# Patient Record
Sex: FEMALE | Race: WHITE
[De-identification: names, ages, dates, MRNs, and addresses within clinical notes are randomized per-mention and may not be internally consistent; named-entity substitution may affect disease eponyms.]

---

## 2018-08-12 ENCOUNTER — HOSPITAL ENCOUNTER (EMERGENCY)
Dept: HOSPITAL 4 - SED | Age: 43
Discharge: HOME | End: 2018-08-12
Payer: COMMERCIAL

## 2018-08-12 VITALS — SYSTOLIC BLOOD PRESSURE: 113 MMHG

## 2018-08-12 VITALS — SYSTOLIC BLOOD PRESSURE: 114 MMHG

## 2018-08-12 VITALS — WEIGHT: 182 LBS | HEIGHT: 66 IN | BODY MASS INDEX: 29.25 KG/M2

## 2018-08-12 DIAGNOSIS — I10: ICD-10-CM

## 2018-08-12 DIAGNOSIS — Z90.89: ICD-10-CM

## 2018-08-12 DIAGNOSIS — Z88.6: ICD-10-CM

## 2018-08-12 DIAGNOSIS — Z98.84: ICD-10-CM

## 2018-08-12 DIAGNOSIS — K64.5: Primary | ICD-10-CM

## 2018-08-12 DIAGNOSIS — K21.9: ICD-10-CM

## 2018-08-12 DIAGNOSIS — Z86.2: ICD-10-CM

## 2018-08-12 DIAGNOSIS — Z90.49: ICD-10-CM

## 2018-08-12 LAB
ALBUMIN SERPL BCP-MCNC: 3.9 G/DL (ref 3.4–4.8)
ALT SERPL W P-5'-P-CCNC: 16 U/L (ref 12–78)
ANION GAP SERPL CALCULATED.3IONS-SCNC: 8 MMOL/L (ref 5–15)
APPEARANCE UR: CLEAR
AST SERPL W P-5'-P-CCNC: 17 U/L (ref 10–37)
BASOPHILS # BLD AUTO: 0.1 K/UL (ref 0–0.2)
BASOPHILS NFR BLD AUTO: 1.3 % (ref 0–2)
BILIRUB SERPL-MCNC: 0.3 MG/DL (ref 0–1)
BILIRUB UR QL STRIP: NEGATIVE
BUN SERPL-MCNC: 14 MG/DL (ref 8–21)
CALCIUM SERPL-MCNC: 9.1 MG/DL (ref 8.4–11)
CHLORIDE SERPL-SCNC: 106 MMOL/L (ref 98–107)
COLOR UR: YELLOW
CREAT SERPL-MCNC: 0.59 MG/DL (ref 0.55–1.3)
EOSINOPHIL # BLD AUTO: 0.1 K/UL (ref 0–0.4)
EOSINOPHIL NFR BLD AUTO: 1.3 % (ref 0–4)
ERYTHROCYTE [DISTWIDTH] IN BLOOD BY AUTOMATED COUNT: 12.8 % (ref 9–15)
GFR SERPL CREATININE-BSD FRML MDRD: 144 ML/MIN (ref 90–?)
GLUCOSE SERPL-MCNC: 68 MG/DL (ref 70–99)
GLUCOSE UR STRIP-MCNC: NEGATIVE MG/DL
HCT VFR BLD AUTO: 35.6 % (ref 36–48)
HGB BLD-MCNC: 12 G/DL (ref 12–16)
HGB UR QL STRIP: NEGATIVE
KETONES UR STRIP-MCNC: NEGATIVE MG/DL
LEUKOCYTE ESTERASE UR QL STRIP: NEGATIVE
LYMPHOCYTES # BLD AUTO: 1.4 K/UL (ref 1–5.5)
LYMPHOCYTES NFR BLD AUTO: 24.6 % (ref 20.5–51.5)
MCH RBC QN AUTO: 30 PG (ref 27–31)
MCHC RBC AUTO-ENTMCNC: 34 % (ref 32–36)
MCV RBC AUTO: 89 FL (ref 79–98)
MONOCYTES # BLD MANUAL: 0.6 K/UL (ref 0–1)
MONOCYTES # BLD MANUAL: 10.2 % (ref 1.7–9.3)
NEUTROPHILS # BLD AUTO: 3.4 K/UL (ref 1.8–7.7)
NEUTROPHILS NFR BLD AUTO: 62.6 % (ref 40–70)
NITRITE UR QL STRIP: NEGATIVE
PH UR STRIP: 6 [PH] (ref 5–8)
PLATELET # BLD AUTO: 263 K/UL (ref 130–430)
POTASSIUM SERPL-SCNC: 3.7 MMOL/L (ref 3.5–5.1)
PROT UR QL STRIP: NEGATIVE
RBC # BLD AUTO: 3.98 MIL/UL (ref 4.2–6.2)
SODIUM SERPLBLD-SCNC: 139 MMOL/L (ref 136–145)
SP GR UR STRIP: 1.01 (ref 1–1.03)
UROBILINOGEN UR STRIP-MCNC: 0.2 MG/DL (ref 0.2–1)
WBC # BLD AUTO: 5.6 K/UL (ref 4.8–10.8)

## 2019-05-10 ENCOUNTER — HOSPITAL ENCOUNTER (INPATIENT)
Dept: HOSPITAL 4 - SED | Age: 44
LOS: 3 days | Discharge: HOME | DRG: 392 | End: 2019-05-13
Attending: INTERNAL MEDICINE | Admitting: INTERNAL MEDICINE
Payer: COMMERCIAL

## 2019-05-10 VITALS — SYSTOLIC BLOOD PRESSURE: 135 MMHG

## 2019-05-10 VITALS — BODY MASS INDEX: 29.35 KG/M2 | HEIGHT: 67 IN | WEIGHT: 187 LBS

## 2019-05-10 DIAGNOSIS — K29.70: Primary | ICD-10-CM

## 2019-05-10 DIAGNOSIS — Z98.84: ICD-10-CM

## 2019-05-10 DIAGNOSIS — I10: ICD-10-CM

## 2019-05-10 DIAGNOSIS — Z88.6: ICD-10-CM

## 2019-05-10 DIAGNOSIS — Z79.899: ICD-10-CM

## 2019-05-10 DIAGNOSIS — Z90.49: ICD-10-CM

## 2019-05-10 DIAGNOSIS — K21.9: ICD-10-CM

## 2019-05-10 DIAGNOSIS — K56.7: ICD-10-CM

## 2019-05-10 LAB
ALBUMIN SERPL BCP-MCNC: 4 G/DL (ref 3.4–4.8)
ALT SERPL W P-5'-P-CCNC: 20 U/L (ref 12–78)
ANION GAP SERPL CALCULATED.3IONS-SCNC: 7 MMOL/L (ref 5–15)
APPEARANCE UR: CLEAR
AST SERPL W P-5'-P-CCNC: 19 U/L (ref 10–37)
BASOPHILS # BLD AUTO: 0.1 K/UL (ref 0–0.2)
BASOPHILS NFR BLD AUTO: 1.3 % (ref 0–2)
BILIRUB SERPL-MCNC: 0.3 MG/DL (ref 0–1)
BILIRUB UR QL STRIP: NEGATIVE
BUN SERPL-MCNC: 12 MG/DL (ref 8–21)
CALCIUM SERPL-MCNC: 9.4 MG/DL (ref 8.4–11)
CHLORIDE SERPL-SCNC: 104 MMOL/L (ref 98–107)
COLOR UR: YELLOW
CREAT SERPL-MCNC: 0.57 MG/DL (ref 0.55–1.3)
EOSINOPHIL # BLD AUTO: 0.1 K/UL (ref 0–0.4)
EOSINOPHIL NFR BLD AUTO: 1.4 % (ref 0–4)
ERYTHROCYTE [DISTWIDTH] IN BLOOD BY AUTOMATED COUNT: 15.6 % (ref 9–15)
GFR SERPL CREATININE-BSD FRML MDRD: 149 ML/MIN (ref 90–?)
GLUCOSE SERPL-MCNC: 88 MG/DL (ref 70–99)
GLUCOSE UR STRIP-MCNC: NEGATIVE MG/DL
HCT VFR BLD AUTO: 35.4 % (ref 36–48)
HGB BLD-MCNC: 11.5 G/DL (ref 12–16)
HGB UR QL STRIP: NEGATIVE
KETONES UR STRIP-MCNC: (no result) MG/DL
LEUKOCYTE ESTERASE UR QL STRIP: NEGATIVE
LIPASE SERPL-CCNC: 160 U/L (ref 73–393)
LYMPHOCYTES # BLD AUTO: 1.4 K/UL (ref 1–5.5)
LYMPHOCYTES NFR BLD AUTO: 26.8 % (ref 20.5–51.5)
MCH RBC QN AUTO: 26 PG (ref 27–31)
MCHC RBC AUTO-ENTMCNC: 32 % (ref 32–36)
MCV RBC AUTO: 80 FL (ref 79–98)
MONOCYTES # BLD MANUAL: 0.5 K/UL (ref 0–1)
MONOCYTES # BLD MANUAL: 10 % (ref 1.7–9.3)
NEUTROPHILS # BLD AUTO: 3.1 K/UL (ref 1.8–7.7)
NEUTROPHILS NFR BLD AUTO: 60.5 % (ref 40–70)
NITRITE UR QL STRIP: NEGATIVE
PH UR STRIP: 6 [PH] (ref 5–8)
PLATELET # BLD AUTO: 295 K/UL (ref 130–430)
POTASSIUM SERPL-SCNC: 3.5 MMOL/L (ref 3.5–5.1)
PROT UR QL STRIP: NEGATIVE
RBC # BLD AUTO: 4.4 MIL/UL (ref 4.2–6.2)
SODIUM SERPLBLD-SCNC: 137 MMOL/L (ref 136–145)
SP GR UR STRIP: 1.01 (ref 1–1.03)
UROBILINOGEN UR STRIP-MCNC: 1 MG/DL (ref 0.2–1)
WBC # BLD AUTO: 5.1 K/UL (ref 4.8–10.8)

## 2019-05-11 VITALS — SYSTOLIC BLOOD PRESSURE: 108 MMHG

## 2019-05-11 VITALS — SYSTOLIC BLOOD PRESSURE: 124 MMHG

## 2019-05-11 VITALS — SYSTOLIC BLOOD PRESSURE: 123 MMHG

## 2019-05-11 VITALS — SYSTOLIC BLOOD PRESSURE: 125 MMHG

## 2019-05-11 RX ADMIN — DEXTROSE AND SODIUM CHLORIDE SCH MLS/HR: 5; 450 INJECTION, SOLUTION INTRAVENOUS at 00:17

## 2019-05-11 RX ADMIN — MORPHINE SULFATE PRN MG: 4 INJECTION INTRAVENOUS at 06:52

## 2019-05-11 RX ADMIN — PANTOPRAZOLE SODIUM SCH MG: 40 TABLET, DELAYED RELEASE ORAL at 20:53

## 2019-05-11 RX ADMIN — MORPHINE SULFATE PRN MG: 4 INJECTION INTRAVENOUS at 11:00

## 2019-05-11 RX ADMIN — MORPHINE SULFATE PRN MG: 4 INJECTION INTRAVENOUS at 02:17

## 2019-05-11 RX ADMIN — KETOROLAC TROMETHAMINE PRN MG: 15 INJECTION, SOLUTION INTRAMUSCULAR; INTRAVENOUS at 16:22

## 2019-05-11 RX ADMIN — ONDANSETRON PRN MG: 2 INJECTION INTRAMUSCULAR; INTRAVENOUS at 10:56

## 2019-05-11 RX ADMIN — ONDANSETRON PRN MG: 2 INJECTION INTRAMUSCULAR; INTRAVENOUS at 06:52

## 2019-05-11 RX ADMIN — DEXTROSE AND SODIUM CHLORIDE SCH MLS/HR: 5; 450 INJECTION, SOLUTION INTRAVENOUS at 10:56

## 2019-05-12 VITALS — SYSTOLIC BLOOD PRESSURE: 126 MMHG

## 2019-05-12 VITALS — SYSTOLIC BLOOD PRESSURE: 123 MMHG

## 2019-05-12 VITALS — SYSTOLIC BLOOD PRESSURE: 118 MMHG

## 2019-05-12 VITALS — SYSTOLIC BLOOD PRESSURE: 119 MMHG

## 2019-05-12 VITALS — SYSTOLIC BLOOD PRESSURE: 132 MMHG

## 2019-05-12 LAB
ALBUMIN SERPL BCP-MCNC: 3.2 G/DL (ref 3.4–4.8)
ALT SERPL W P-5'-P-CCNC: 108 U/L (ref 12–78)
ANION GAP SERPL CALCULATED.3IONS-SCNC: 8 MMOL/L (ref 5–15)
APPEARANCE UR: CLEAR
AST SERPL W P-5'-P-CCNC: 113 U/L (ref 10–37)
BASOPHILS # BLD AUTO: 0 K/UL (ref 0–0.2)
BASOPHILS NFR BLD AUTO: 0.4 % (ref 0–2)
BILIRUB SERPL-MCNC: 0.3 MG/DL (ref 0–1)
BILIRUB UR QL STRIP: NEGATIVE
BUN SERPL-MCNC: 9 MG/DL (ref 8–21)
CALCIUM SERPL-MCNC: 8 MG/DL (ref 8.4–11)
CHLORIDE SERPL-SCNC: 104 MMOL/L (ref 98–107)
COLOR UR: YELLOW
CREAT SERPL-MCNC: 0.68 MG/DL (ref 0.55–1.3)
EOSINOPHIL # BLD AUTO: 0.1 K/UL (ref 0–0.4)
EOSINOPHIL NFR BLD AUTO: 0.9 % (ref 0–4)
ERYTHROCYTE [DISTWIDTH] IN BLOOD BY AUTOMATED COUNT: 16 % (ref 9–15)
GFR SERPL CREATININE-BSD FRML MDRD: 121 ML/MIN (ref 90–?)
GLUCOSE SERPL-MCNC: 102 MG/DL (ref 70–99)
GLUCOSE UR STRIP-MCNC: NEGATIVE MG/DL
HCT VFR BLD AUTO: 30.8 % (ref 36–48)
HGB BLD-MCNC: 10 G/DL (ref 12–16)
HGB UR QL STRIP: NEGATIVE
KETONES UR STRIP-MCNC: NEGATIVE MG/DL
LEUKOCYTE ESTERASE UR QL STRIP: NEGATIVE
LYMPHOCYTES # BLD AUTO: 0.6 K/UL (ref 1–5.5)
LYMPHOCYTES NFR BLD AUTO: 10.7 % (ref 20.5–51.5)
MCH RBC QN AUTO: 26 PG (ref 27–31)
MCHC RBC AUTO-ENTMCNC: 32 % (ref 32–36)
MCV RBC AUTO: 80 FL (ref 79–98)
MONOCYTES # BLD MANUAL: 0.5 K/UL (ref 0–1)
MONOCYTES # BLD MANUAL: 8.6 % (ref 1.7–9.3)
NEUTROPHILS # BLD AUTO: 4.8 K/UL (ref 1.8–7.7)
NEUTROPHILS NFR BLD AUTO: 79.4 % (ref 40–70)
NITRITE UR QL STRIP: NEGATIVE
PH UR STRIP: 5.5 [PH] (ref 5–8)
PLATELET # BLD AUTO: 205 K/UL (ref 130–430)
POTASSIUM SERPL-SCNC: 3.1 MMOL/L (ref 3.5–5.1)
PROT UR QL STRIP: NEGATIVE
RBC # BLD AUTO: 3.82 MIL/UL (ref 4.2–6.2)
SODIUM SERPLBLD-SCNC: 137 MMOL/L (ref 136–145)
SP GR UR STRIP: <1.005 (ref 1–1.03)
UROBILINOGEN UR STRIP-MCNC: 0.2 MG/DL (ref 0.2–1)
WBC # BLD AUTO: 6.1 K/UL (ref 4.8–10.8)

## 2019-05-12 RX ADMIN — DEXTROSE AND SODIUM CHLORIDE SCH MLS/HR: 5; 450 INJECTION, SOLUTION INTRAVENOUS at 01:28

## 2019-05-12 RX ADMIN — PANTOPRAZOLE SODIUM SCH MG: 40 TABLET, DELAYED RELEASE ORAL at 21:26

## 2019-05-12 RX ADMIN — ACETAMINOPHEN PRN MG: 325 TABLET ORAL at 08:01

## 2019-05-12 RX ADMIN — KETOROLAC TROMETHAMINE PRN MG: 15 INJECTION, SOLUTION INTRAMUSCULAR; INTRAVENOUS at 11:16

## 2019-05-12 RX ADMIN — PANTOPRAZOLE SODIUM SCH MG: 40 TABLET, DELAYED RELEASE ORAL at 07:59

## 2019-05-12 RX ADMIN — ACETAMINOPHEN PRN MG: 325 TABLET ORAL at 21:27

## 2019-05-12 RX ADMIN — ACETAMINOPHEN PRN MG: 325 TABLET ORAL at 01:27

## 2019-05-12 RX ADMIN — ONDANSETRON PRN MG: 2 INJECTION INTRAMUSCULAR; INTRAVENOUS at 16:44

## 2019-05-12 RX ADMIN — DEXTROSE AND SODIUM CHLORIDE SCH MLS/HR: 5; 450 INJECTION, SOLUTION INTRAVENOUS at 14:30

## 2019-05-12 RX ADMIN — MORPHINE SULFATE PRN MG: 4 INJECTION INTRAVENOUS at 16:44

## 2019-05-12 RX ADMIN — MORPHINE SULFATE PRN MG: 4 INJECTION INTRAVENOUS at 01:27

## 2019-05-13 VITALS — SYSTOLIC BLOOD PRESSURE: 123 MMHG

## 2019-05-13 VITALS — SYSTOLIC BLOOD PRESSURE: 116 MMHG

## 2019-05-13 VITALS — SYSTOLIC BLOOD PRESSURE: 149 MMHG

## 2019-05-13 VITALS — SYSTOLIC BLOOD PRESSURE: 133 MMHG

## 2019-05-13 LAB
ALBUMIN SERPL BCP-MCNC: 2.6 G/DL (ref 3.4–4.8)
ALT SERPL W P-5'-P-CCNC: 73 U/L (ref 12–78)
ANION GAP SERPL CALCULATED.3IONS-SCNC: 8 MMOL/L (ref 5–15)
AST SERPL W P-5'-P-CCNC: 53 U/L (ref 10–37)
BASOPHILS # BLD AUTO: 0 K/UL (ref 0–0.2)
BASOPHILS NFR BLD AUTO: 0.7 % (ref 0–2)
BILIRUB SERPL-MCNC: 0.1 MG/DL (ref 0–1)
BUN SERPL-MCNC: 5 MG/DL (ref 8–21)
CALCIUM SERPL-MCNC: 8 MG/DL (ref 8.4–11)
CHLORIDE SERPL-SCNC: 107 MMOL/L (ref 98–107)
CREAT SERPL-MCNC: 0.51 MG/DL (ref 0.55–1.3)
EOSINOPHIL # BLD AUTO: 0.1 K/UL (ref 0–0.4)
EOSINOPHIL NFR BLD AUTO: 2.1 % (ref 0–4)
ERYTHROCYTE [DISTWIDTH] IN BLOOD BY AUTOMATED COUNT: 15.9 % (ref 9–15)
GFR SERPL CREATININE-BSD FRML MDRD: 169 ML/MIN (ref 90–?)
GLUCOSE SERPL-MCNC: 90 MG/DL (ref 70–99)
HCT VFR BLD AUTO: 27.7 % (ref 36–48)
HGB BLD-MCNC: 9.1 G/DL (ref 12–16)
LIPASE SERPL-CCNC: 104 U/L (ref 73–393)
LYMPHOCYTES # BLD AUTO: 0.9 K/UL (ref 1–5.5)
LYMPHOCYTES NFR BLD AUTO: 18.1 % (ref 20.5–51.5)
MCH RBC QN AUTO: 27 PG (ref 27–31)
MCHC RBC AUTO-ENTMCNC: 33 % (ref 32–36)
MCV RBC AUTO: 81 FL (ref 79–98)
MONOCYTES # BLD MANUAL: 0.6 K/UL (ref 0–1)
MONOCYTES # BLD MANUAL: 12.7 % (ref 1.7–9.3)
NEUTROPHILS # BLD AUTO: 3.2 K/UL (ref 1.8–7.7)
NEUTROPHILS NFR BLD AUTO: 66.4 % (ref 40–70)
PLATELET # BLD AUTO: 167 K/UL (ref 130–430)
POTASSIUM SERPL-SCNC: 3.7 MMOL/L (ref 3.5–5.1)
RBC # BLD AUTO: 3.42 MIL/UL (ref 4.2–6.2)
SODIUM SERPLBLD-SCNC: 140 MMOL/L (ref 136–145)
WBC # BLD AUTO: 4.9 K/UL (ref 4.8–10.8)

## 2019-05-13 RX ADMIN — MORPHINE SULFATE PRN MG: 4 INJECTION INTRAVENOUS at 02:14

## 2019-05-13 RX ADMIN — DEXTROSE AND SODIUM CHLORIDE SCH MLS/HR: 5; 450 INJECTION, SOLUTION INTRAVENOUS at 02:21

## 2019-05-13 RX ADMIN — PANTOPRAZOLE SODIUM SCH MG: 40 TABLET, DELAYED RELEASE ORAL at 09:00

## 2019-09-26 ENCOUNTER — OFFICE (OUTPATIENT)
Dept: URBAN - METROPOLITAN AREA CLINIC 70 | Facility: CLINIC | Age: 44
End: 2019-09-26

## 2019-09-26 VITALS
HEIGHT: 66 IN | HEART RATE: 74 BPM | TEMPERATURE: 98 F | DIASTOLIC BLOOD PRESSURE: 87 MMHG | SYSTOLIC BLOOD PRESSURE: 130 MMHG | WEIGHT: 190 LBS

## 2019-09-26 DIAGNOSIS — R10.13 EPIGASTRIC PAIN: ICD-10-CM

## 2019-09-26 DIAGNOSIS — Z98.84 BARIATRIC SURGERY STATUS: ICD-10-CM

## 2019-09-26 DIAGNOSIS — R11.2 NAUSEA & VOMITING: ICD-10-CM

## 2019-09-26 PROCEDURE — 99205 OFFICE O/P NEW HI 60 MIN: CPT | Performed by: SPECIALIST

## 2019-09-26 NOTE — SERVICEHPINOTES
43-year-old female had gastric bypass 8 years ago.  In 2016, the patient presented with upper abdominal pain, nausea, vomiting and intolerance of oral intake.  The patient had laparoscopy and her gastric bypass was revised.  She is done well until May of this year when her symptoms have come back.  The patient is able to only eat very small volume nebulizer and protein shakes to maintain her weight.  When she eats, she has fullness and pain in the epigastric area, associated with regurgitation and heartburn.  She sometimes has to vomit to alleviate the discomfort.  She denies hematemesis or melena.  She is no longer using any nonsteroidal anti-inflammatory agents.  She is post cholecystectomy.   In spite of that limited oral intake, the patient is able to maintain her weight.

## 2019-11-01 ENCOUNTER — AMBULATORY SURGICAL CENTER (OUTPATIENT)
Dept: URBAN - METROPOLITAN AREA SURGERY 5 | Facility: SURGERY | Age: 44
End: 2019-11-01

## 2019-11-01 VITALS
OXYGEN SATURATION: 99 % | TEMPERATURE: 98.4 F | HEIGHT: 66 IN | WEIGHT: 190 LBS | SYSTOLIC BLOOD PRESSURE: 166 MMHG | HEART RATE: 72 BPM | DIASTOLIC BLOOD PRESSURE: 79 MMHG | RESPIRATION RATE: 11 BRPM

## 2019-11-01 DIAGNOSIS — R11.2 NAUSEA WITH VOMITING, UNSPECIFIED: ICD-10-CM

## 2019-11-01 DIAGNOSIS — Z98.84 BARIATRIC SURGERY STATUS: ICD-10-CM

## 2019-11-01 PROCEDURE — 43239 EGD BIOPSY SINGLE/MULTIPLE: CPT | Performed by: SPECIALIST

## 2019-11-01 RX ORDER — OMEPRAZOLE 40 MG/1
40 CAPSULE, DELAYED RELEASE ORAL
Qty: 30 | Status: ACTIVE

## 2019-11-01 RX ORDER — SUCRALFATE 1 G/1
4 TABLET ORAL
Qty: 100 | Status: ACTIVE

## 2019-11-02 LAB
PATHOLOGY REPORT: .: (no result)

## 2024-10-18 ENCOUNTER — HOSPITAL ENCOUNTER (EMERGENCY)
Dept: HOSPITAL 4 - SED | Age: 49
Discharge: HOME | End: 2024-10-18
Payer: COMMERCIAL

## 2024-10-18 VITALS
RESPIRATION RATE: 22 BRPM | HEART RATE: 73 BPM | OXYGEN SATURATION: 98 % | SYSTOLIC BLOOD PRESSURE: 164 MMHG | TEMPERATURE: 98.3 F

## 2024-10-18 VITALS
TEMPERATURE: 98.1 F | HEART RATE: 73 BPM | OXYGEN SATURATION: 98 % | RESPIRATION RATE: 16 BRPM | SYSTOLIC BLOOD PRESSURE: 147 MMHG

## 2024-10-18 VITALS — BODY MASS INDEX: 44.3 KG/M2 | HEIGHT: 63 IN | WEIGHT: 250 LBS

## 2024-10-18 DIAGNOSIS — I10: ICD-10-CM

## 2024-10-18 DIAGNOSIS — Z79.899: ICD-10-CM

## 2024-10-18 DIAGNOSIS — K21.9: ICD-10-CM

## 2024-10-18 DIAGNOSIS — Z90.49: ICD-10-CM

## 2024-10-18 DIAGNOSIS — Z87.19: ICD-10-CM

## 2024-10-18 DIAGNOSIS — Z98.84: ICD-10-CM

## 2024-10-18 DIAGNOSIS — Z88.5: ICD-10-CM

## 2024-10-18 DIAGNOSIS — R10.31: Primary | ICD-10-CM

## 2024-10-18 DIAGNOSIS — Z88.6: ICD-10-CM

## 2024-10-18 LAB
ALBUMIN SERPL BCP-MCNC: 3.8 G/DL (ref 3.4–4.8)
ALT SERPL W P-5'-P-CCNC: 26 U/L (ref 12–78)
AMYLASE SERPL-CCNC: 48 U/L (ref 0–100)
ANION GAP SERPL CALCULATED.3IONS-SCNC: 4 MMOL/L (ref 5–15)
APPEARANCE UR: CLEAR
AST SERPL W P-5'-P-CCNC: 24 U/L (ref 10–37)
BASOPHILS # BLD AUTO: 0.1 K/UL (ref 0–0.2)
BASOPHILS NFR BLD AUTO: 1.4 % (ref 0–2)
BILIRUB DIRECT SERPL-MCNC: 0.1 MG/DL (ref 0–0.3)
BILIRUB SERPL-MCNC: 0.2 MG/DL (ref 0–1)
BILIRUB UR QL STRIP: NEGATIVE
BUN SERPL-MCNC: 16 MG/DL (ref 8–21)
CALCIUM SERPL-MCNC: 9.1 MG/DL (ref 8.4–11)
CHLORIDE SERPL-SCNC: 106 MMOL/L (ref 98–107)
CO2 SERPLBLD-SCNC: 33 MMOL/L (ref 23–29)
COLOR UR: YELLOW
CREAT SERPL-MCNC: 0.81 MG/DL (ref 0.55–1.3)
EOSINOPHIL # BLD AUTO: 0.2 K/UL (ref 0–0.4)
EOSINOPHIL NFR BLD AUTO: 2 % (ref 0–4)
ERYTHROCYTE [DISTWIDTH] IN BLOOD BY AUTOMATED COUNT: 15.2 % (ref 9–15)
GFR SERPL CREATININE-BSD FRML MDRD: 97 ML/MIN (ref 90–?)
GLUCOSE SERPL-MCNC: 91 MG/DL (ref 74–106)
GLUCOSE UR STRIP-MCNC: NEGATIVE MG/DL
HCT VFR BLD AUTO: 35.5 % (ref 36–48)
HGB BLD-MCNC: 12.1 G/DL (ref 12–16)
HGB UR QL STRIP: NEGATIVE
INR PPP: 1 (ref 0.8–1.2)
KETONES UR STRIP-MCNC: NEGATIVE MG/DL
LEUKOCYTE ESTERASE UR QL STRIP: NEGATIVE
LIPASE SERPL-CCNC: 37 U/L (ref 16–77)
LYMPHOCYTES # BLD AUTO: 2.4 K/UL (ref 1–5.5)
LYMPHOCYTES NFR BLD AUTO: 31.9 % (ref 20.5–51.5)
MCH RBC QN AUTO: 28 PG (ref 27–31)
MCHC RBC AUTO-ENTMCNC: 34 % (ref 32–36)
MCV RBC AUTO: 83 FL (ref 79–98)
MONOCYTES # BLD MANUAL: 0.7 K/UL (ref 0–1)
MONOCYTES # BLD MANUAL: 9.3 % (ref 1.7–9.3)
NEUTROPHILS # BLD AUTO: 4.2 K/UL (ref 1.8–7.7)
NEUTROPHILS NFR BLD AUTO: 55.4 % (ref 40–70)
NITRITE UR QL STRIP: NEGATIVE
PH UR STRIP: 5.5 [PH] (ref 5–8)
PLATELET # BLD AUTO: 286 K/UL (ref 130–430)
POTASSIUM SERPL-SCNC: 3.4 MMOL/L (ref 3.5–5.1)
PROT SERPL-MCNC: 7.4 G/DL (ref 6.4–8.3)
PROT UR QL STRIP: NEGATIVE
PROTHROMBIN TIME: 10 SECS (ref 9.5–12.5)
RBC # BLD AUTO: 4.3 MIL/UL (ref 4.2–6.2)
SODIUM SERPLBLD-SCNC: 143 MMOL/L (ref 136–145)
SP GR UR STRIP: 1.01 (ref 1–1.03)
UROBILINOGEN UR STRIP-MCNC: 0.2 MG/DL (ref 0.2–1)
WBC # BLD AUTO: 7.5 K/UL (ref 4.8–10.8)

## 2024-10-18 PROCEDURE — 36415 COLL VENOUS BLD VENIPUNCTURE: CPT

## 2024-10-18 PROCEDURE — 85025 COMPLETE CBC W/AUTO DIFF WBC: CPT

## 2024-10-18 PROCEDURE — 81003 URINALYSIS AUTO W/O SCOPE: CPT

## 2024-10-18 PROCEDURE — 82150 ASSAY OF AMYLASE: CPT

## 2024-10-18 PROCEDURE — 83690 ASSAY OF LIPASE: CPT

## 2024-10-18 PROCEDURE — 80076 HEPATIC FUNCTION PANEL: CPT

## 2024-10-18 PROCEDURE — 85730 THROMBOPLASTIN TIME PARTIAL: CPT

## 2024-10-18 PROCEDURE — 81001 URINALYSIS AUTO W/SCOPE: CPT

## 2024-10-18 PROCEDURE — 96372 THER/PROPH/DIAG INJ SC/IM: CPT

## 2024-10-18 PROCEDURE — 80048 BASIC METABOLIC PNL TOTAL CA: CPT

## 2024-10-18 PROCEDURE — 85610 PROTHROMBIN TIME: CPT

## 2024-10-18 PROCEDURE — 83605 ASSAY OF LACTIC ACID: CPT

## 2024-10-18 PROCEDURE — 82397 CHEMILUMINESCENT ASSAY: CPT

## 2024-10-18 PROCEDURE — 74176 CT ABD & PELVIS W/O CONTRAST: CPT

## 2024-10-18 PROCEDURE — 99285 EMERGENCY DEPT VISIT HI MDM: CPT

## 2024-10-18 RX ADMIN — KETOROLAC TROMETHAMINE ONE MG: 60 INJECTION, SOLUTION INTRAMUSCULAR at 15:58
